# Patient Record
Sex: MALE | Race: ASIAN | NOT HISPANIC OR LATINO | URBAN - METROPOLITAN AREA
[De-identification: names, ages, dates, MRNs, and addresses within clinical notes are randomized per-mention and may not be internally consistent; named-entity substitution may affect disease eponyms.]

---

## 2018-05-12 ENCOUNTER — EMERGENCY (EMERGENCY)
Facility: HOSPITAL | Age: 53
LOS: 1 days | Discharge: HOME | End: 2018-05-12
Attending: EMERGENCY MEDICINE | Admitting: EMERGENCY MEDICINE
Payer: MEDICAID

## 2018-05-12 VITALS
OXYGEN SATURATION: 100 % | HEART RATE: 68 BPM | TEMPERATURE: 98 F | RESPIRATION RATE: 18 BRPM | SYSTOLIC BLOOD PRESSURE: 146 MMHG | DIASTOLIC BLOOD PRESSURE: 78 MMHG

## 2018-05-12 PROCEDURE — 99283 EMERGENCY DEPT VISIT LOW MDM: CPT

## 2018-05-12 RX ORDER — IBUPROFEN 200 MG
1 TABLET ORAL
Qty: 24 | Refills: 0 | OUTPATIENT
Start: 2018-05-12

## 2018-05-12 RX ORDER — ACETAMINOPHEN 500 MG
975 TABLET ORAL ONCE
Qty: 0 | Refills: 0 | Status: COMPLETED | OUTPATIENT
Start: 2018-05-12 | End: 2018-05-12

## 2018-05-12 RX ORDER — IBUPROFEN 200 MG
600 TABLET ORAL ONCE
Qty: 0 | Refills: 0 | Status: COMPLETED | OUTPATIENT
Start: 2018-05-12 | End: 2018-05-12

## 2018-05-12 RX ADMIN — Medication 975 MILLIGRAM(S): at 09:23

## 2018-05-12 RX ADMIN — Medication 600 MILLIGRAM(S): at 09:23

## 2018-05-12 NOTE — ED PROVIDER NOTE - ATTENDING CONTRIBUTION TO CARE
I performed a history and physical exam of the patient and discussed their management with the resident and /or advanced care provider. I reviewed the resident and /or ACP's note and agree with the documented findings and plan of care. My medical decison making and observations are found above.  2nd tooth looks nl, nl jaw motion

## 2018-05-12 NOTE — ED PROVIDER NOTE - CARE PLAN
Principal Discharge DX:	Toothache Principal Discharge DX:	Toothache  Assessment and plan of treatment:	Follow up with orthodonist or can call dental clinic for additional appointment  Take pain medications as prescribed

## 2018-05-12 NOTE — ED PROVIDER NOTE - ENMT, MLM
Airway patent, Nasal mucosa clear. Mouth with normal mucosa. Throat has no vesicles, no oropharyngeal exudates and uvula is midline. Airway patent, Nasal mucosa clear. Mouth with normal mucosa. Dental caries to tooth #3, tooth #2 with no signs of infection or caries, no obvious erythema or signs of abscess

## 2018-05-12 NOTE — ED PROVIDER NOTE - PLAN OF CARE
Follow up with orthodonist or can call dental clinic for additional appointment  Take pain medications as prescribed

## 2018-05-12 NOTE — ED PROVIDER NOTE - OBJECTIVE STATEMENT
53M with PMH of HTN presenting with toothache of tooth #2 for 3 weeks. Patient states he needs a tooth extraction. Patient states he spoke to his orthodontist with earliest appointment on 5/30 and was directed to ED if he wanted earlier care. States he finished a course of amoxicillin yesterday and has been taking motrin for pain control thus far. Has been experiencing cold sensitivity while eating. Denies fever, chills, cp, sob, n/v/d, dizziness, headache  Divehi speaking, declining translation with brother translating